# Patient Record
(demographics unavailable — no encounter records)

---

## 2024-11-18 NOTE — BIRTH HISTORY
[Birthweight ___ kg] : weight [unfilled] kg [Weight ___ kg] : weight [unfilled] kg [de-identified] : Resuscitation: Peds called to delivery for 31wk delivery. 31.4 wk AGA male born via  to a 43 y/o [id number]. Mother admitted for PPROM, pre-eclampsia treated with mag. Maternal medical/surgical hx of heart murmur. Maternal ob/gyn hx of GHTN with labile blood pressures. Mother presented to [facility] over weekend with headache and facial swelling and received steroids. OB also evaluated mother and recommended a C/S. Mother refused at the time and left the hospital. Mother then presented to [facility] for further care. Maternal labs include Blood Type B+ , HIV - , RPR NR , Rubella I , Hep B - , GBS unknown (received ampx5, Unasyn x1). PROM at 01:45 on 10/16 with clear fluids (ROM hours: 38H 30M). Baby emerged vigorous, crying, was w/d/s/s with APGARS of 7/8.  [de-identified] : 31 wk, RDS, feeding and temp support, MRSA colonized

## 2024-11-18 NOTE — PATIENT INSTRUCTIONS
[Verbal patient instructions provided] : Verbal patient instructions provided. [FreeTextEntry1] : Developmental Clinic appt     at 6 months corrected age     phone: [phone] [FreeTextEntry6] : n/a [FreeTextEntry7] : n/a [FreeTextEntry8] : per PMD [FreeTextEntry9] : n/a [de-identified] : skin care instructions reviewed with caregiver, aquaphor to skin, avoid direct sun exposure

## 2024-11-18 NOTE — HISTORY OF PRESENT ILLNESS
[Gestational Age: ___] : Gestational Age: [unfilled] [Chronological Age: ___] : Chronological Age: [unfilled] [Corrected Age: ___] : Corrected Age: [unfilled] [Developmental Pediatrics: ___] : Developmental Pediatrics: [unfilled] [de-identified] : [place] [facility] [place] [de-identified] :  High Risk & Developmental follow up

## 2024-11-18 NOTE — REASON FOR VISIT
[Weight Check] : weight check [Developmental Delay] : developmental delay [FreeTextEntry3] : 31.4 week gestation [Medical Records] : medical records

## 2025-05-06 NOTE — PLAN
[No delays noted, anticipatory developmental guidance given.] : No delays noted, anticipatory developmental guidance given.  [Adjusted age milestones discussed at length.] : Adjusted age milestones discussed at length. [Discussed signs for solid food readiness including- open mouth for spoon, sits with support, good head and neck control.] : Discussed signs for solid food readiness including- open mouth for spoon, sits with support, good head and neck control.  [Safety counseling given regarding major safety issues for children this age.] : Safety counseling given regarding major safety issues for children this age. [Reading daily was encouraged.] : Reading daily was encouraged.  [Avoid choking hazards such as peanuts, hot dogs, un-cut grapes, hot dogs, peanut butter, fruits with skins and balloons.] : Avoid choking hazards such as peanuts, hot dogs, un-cut grapes, hot dogs, peanut butter, fruits with skins and balloons.  [All medications should be stored in a child proof container out of reach of the child.] : All medications should be stored in a child proof container out of reach of the child.  [FreeTextEntry1] : RoR book given F/u 6months

## 2025-05-06 NOTE — PHYSICAL EXAM
[Roll Prone to Supine] : roll prone to supine [Roll Supine to Prone] : rolls supine to prone [Sits With Arm Support] : sits with arm support [Social Smile] : has a social smile [Orients To Voice] : orients to voice [Laughs Aloud] : laughs aloud [Babbling] : babbling [Up on Forearms Prone] : up on forearms prone [Manipulates Fingers] : manipulates fingers [Transfer] : transfers objects [Unilateral Reach/Grasp] : unilaterally reaches/grasps  [Normal] : awake and interactive, normal strength tone throughout. [Finger Feeding] : does not finger feed [Gesture Language] : does not gesture language ["Luis Manuel" Appropriately] : says "Luis Manuel" inappropriately ["Mama" Appropriately] : says "Mama" inappropriately [de-identified] : NAAT, pupils equal round and reactive to light, anicteric, normal external ear anatomy, nares patent with no discharge, throat supple [de-identified] :  soft, non tender, non distended [de-identified] : fine macular rash on face (has sensitive skin) [de-identified] : Good eye contact, smiles responsively

## 2025-05-06 NOTE — REVIEW OF SYSTEMS
[Fever] : no fever [Eye Discharge] : no discharge [Nasal Congestion] : no nasal congestion [Cough] : no cough [Diarrhea] : no diarrhea [Constipation] : no constipation [Seizure] : no seizures [de-identified] : Sensitive

## 2025-05-06 NOTE — REASON FOR VISIT
[Initial Visit] : an initial visit for [Mother] : mother [FreeTextEntry3] :  Developmental follow up 2/2 prematurity. Due to patient age, caregiver history required for comprehensive evaluation. Patient was referred from the primary medical team for enhanced developmental surveillance because of medical history.

## 2025-05-06 NOTE — HISTORY OF PRESENT ILLNESS
[___ ounces/feeding] : ~TATY moseley/feeding [___ Times/day] : [unfilled] times/day [Normal] : normal [None] : none [Gestational Age: ___] : Gestational Age in Weeks: [unfilled] [Chronological Age: ___] : Chronological Age in Months: [unfilled] [Corrected Age: ___] : Corrected Age: [unfilled] [No Parental Concerns] : no parental concerns [de-identified] : Eats everything . Has introduced allergenic foods [TWNoteComboBox1] : Neosure [TextEntry] : Has sensitive skin and uses Aveeno products

## 2025-05-06 NOTE — FAMILY HISTORY
[TextEntry] :  Denies Family History of cardiac arrythmias. Denies family history of speech delay, autism, learning disability, ADHD, seizures, mental health issues, depression, schizophrenia

## 2025-05-06 NOTE — BIRTH HISTORY
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